# Patient Record
Sex: FEMALE | Race: WHITE | NOT HISPANIC OR LATINO | Employment: FULL TIME | URBAN - METROPOLITAN AREA
[De-identification: names, ages, dates, MRNs, and addresses within clinical notes are randomized per-mention and may not be internally consistent; named-entity substitution may affect disease eponyms.]

---

## 2023-03-27 ENCOUNTER — OFFICE VISIT (OUTPATIENT)
Dept: FAMILY MEDICINE | Facility: CLINIC | Age: 38
End: 2023-03-27
Payer: COMMERCIAL

## 2023-03-27 VITALS
BODY MASS INDEX: 46.1 KG/M2 | HEIGHT: 64 IN | DIASTOLIC BLOOD PRESSURE: 88 MMHG | SYSTOLIC BLOOD PRESSURE: 132 MMHG | HEART RATE: 90 BPM | OXYGEN SATURATION: 97 % | WEIGHT: 270 LBS

## 2023-03-27 DIAGNOSIS — E28.2 PCOS (POLYCYSTIC OVARIAN SYNDROME): ICD-10-CM

## 2023-03-27 DIAGNOSIS — G71.12 MYOTONIA CONGENITA: ICD-10-CM

## 2023-03-27 DIAGNOSIS — J45.20 MILD INTERMITTENT ASTHMA WITHOUT COMPLICATION: ICD-10-CM

## 2023-03-27 PROCEDURE — 1159F PR MEDICATION LIST DOCUMENTED IN MEDICAL RECORD: ICD-10-PCS | Mod: S$GLB,,, | Performed by: FAMILY MEDICINE

## 2023-03-27 PROCEDURE — 3075F SYST BP GE 130 - 139MM HG: CPT | Mod: S$GLB,,, | Performed by: FAMILY MEDICINE

## 2023-03-27 PROCEDURE — 3075F PR MOST RECENT SYSTOLIC BLOOD PRESS GE 130-139MM HG: ICD-10-PCS | Mod: S$GLB,,, | Performed by: FAMILY MEDICINE

## 2023-03-27 PROCEDURE — 99204 PR OFFICE/OUTPT VISIT, NEW, LEVL IV, 45-59 MIN: ICD-10-PCS | Mod: S$GLB,,, | Performed by: FAMILY MEDICINE

## 2023-03-27 PROCEDURE — 3079F PR MOST RECENT DIASTOLIC BLOOD PRESSURE 80-89 MM HG: ICD-10-PCS | Mod: S$GLB,,, | Performed by: FAMILY MEDICINE

## 2023-03-27 PROCEDURE — 3008F PR BODY MASS INDEX (BMI) DOCUMENTED: ICD-10-PCS | Mod: S$GLB,,, | Performed by: FAMILY MEDICINE

## 2023-03-27 PROCEDURE — 3008F BODY MASS INDEX DOCD: CPT | Mod: S$GLB,,, | Performed by: FAMILY MEDICINE

## 2023-03-27 PROCEDURE — 1159F MED LIST DOCD IN RCRD: CPT | Mod: S$GLB,,, | Performed by: FAMILY MEDICINE

## 2023-03-27 PROCEDURE — 3079F DIAST BP 80-89 MM HG: CPT | Mod: S$GLB,,, | Performed by: FAMILY MEDICINE

## 2023-03-27 PROCEDURE — 99204 OFFICE O/P NEW MOD 45 MIN: CPT | Mod: S$GLB,,, | Performed by: FAMILY MEDICINE

## 2023-03-27 RX ORDER — DROSPIRENONE AND ETHINYL ESTRADIOL 0.03MG-3MG
KIT ORAL
COMMUNITY

## 2023-03-27 RX ORDER — METFORMIN HYDROCHLORIDE 1000 MG/1
TABLET ORAL
COMMUNITY
Start: 2023-02-06

## 2023-03-27 RX ORDER — ALBUTEROL SULFATE 90 UG/1
AEROSOL, METERED RESPIRATORY (INHALATION)
COMMUNITY

## 2023-03-27 RX ORDER — BUDESONIDE AND FORMOTEROL FUMARATE DIHYDRATE 160; 4.5 UG/1; UG/1
2 AEROSOL RESPIRATORY (INHALATION) 2 TIMES DAILY
COMMUNITY
Start: 2022-12-29

## 2023-03-27 RX ORDER — OMEPRAZOLE 40 MG/1
40 CAPSULE, DELAYED RELEASE ORAL
COMMUNITY
Start: 2023-02-09

## 2023-03-27 RX ORDER — ALBUTEROL SULFATE 90 UG/1
2 AEROSOL, METERED RESPIRATORY (INHALATION)
COMMUNITY
Start: 2023-03-10

## 2023-03-27 RX ORDER — CYCLOBENZAPRINE HCL 5 MG
5 TABLET ORAL NIGHTLY PRN
Qty: 30 TABLET | Refills: 3 | Status: SHIPPED | OUTPATIENT
Start: 2023-03-27 | End: 2023-04-06

## 2023-03-27 NOTE — PATIENT INSTRUCTIONS
Will place referral for genetics at [facility]    Diet - focus on unprocessed whole foods diet - nuts, beans, eggs, whole grains, fruits, vegetables, leans proteins    Exercise - focus on swimming and weight lifting    Consider intermittent fasting - fast for 16 hours and eat for 8, while maintaining good eating habits.    Follow up 6 months    Use muscle relaxer as needed at bedtime

## 2023-03-27 NOTE — PROGRESS NOTES
"  [facility]  [facility] - [place], [place]    Family Medicine Office Visit    Chief Complaint   Patient presents with    Establish Care        HPI:  37 female in good health here to establish care.  Has some white coat HTN.     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      General:  AOx3, well nourished and developed in no acute distress  Eyes:  PERRLA, EOMI, vision intact grossly  ENT:  normal hearing, moist oral mucosa  Neck:  trachea midline with no masses or thyromegaly  Heart:  RRR, no murmurs.  No edema noted, extremities warm and well perfused  Lungs:  clear to auscultation bilaterally with symmetric chest movement  Abdomen:  Soft, nontender, nondistended.  Normal bowel sounds  Musculoskeletal:  Normal gait.  Normal posture.  Normal muscular development with no joint swelling.  Neurological:  CN II-XII grossly intact. Symmetric strength and sensation  Psych:  Normal mood and affect.  Able to demonstrate good judgement and personal insight.      Assessment/Plan:    Myotonia congenita  -     Ambulatory referral/consult to Genetics; Future; Expected date: 04/03/2023    PCOS (polycystic ovarian syndrome)    Mild intermittent asthma without complication     1.  Place referralt to genetics - use muscle relaxer at bedtime prn  2.  Stable on metformin, will pursue dietary therapy and fasting to iimprove insulin " homeostasis  3.  stable

## 2023-04-03 ENCOUNTER — PATIENT MESSAGE (OUTPATIENT)
Dept: OBSTETRICS AND GYNECOLOGY | Facility: CLINIC | Age: 38
End: 2023-04-03
Payer: COMMERCIAL

## 2023-04-11 ENCOUNTER — PATIENT MESSAGE (OUTPATIENT)
Dept: ADMINISTRATIVE | Facility: HOSPITAL | Age: 38
End: 2023-04-11
Payer: COMMERCIAL

## 2024-07-09 ENCOUNTER — PATIENT MESSAGE (OUTPATIENT)
Dept: ADMINISTRATIVE | Facility: HOSPITAL | Age: 39
End: 2024-07-09
Payer: COMMERCIAL

## 2024-12-02 ENCOUNTER — OFFICE VISIT (OUTPATIENT)
Dept: FAMILY MEDICINE | Facility: CLINIC | Age: 39
End: 2024-12-02
Payer: COMMERCIAL

## 2024-12-02 VITALS
OXYGEN SATURATION: 98 % | WEIGHT: 262.81 LBS | DIASTOLIC BLOOD PRESSURE: 86 MMHG | HEART RATE: 86 BPM | HEIGHT: 64 IN | SYSTOLIC BLOOD PRESSURE: 138 MMHG | BODY MASS INDEX: 44.87 KG/M2

## 2024-12-02 DIAGNOSIS — E28.2 PCOS (POLYCYSTIC OVARIAN SYNDROME): Primary | ICD-10-CM

## 2024-12-02 DIAGNOSIS — J45.20 MILD INTERMITTENT ASTHMA WITHOUT COMPLICATION: ICD-10-CM

## 2024-12-02 PROCEDURE — 3008F BODY MASS INDEX DOCD: CPT | Mod: CPTII,S$GLB,, | Performed by: FAMILY MEDICINE

## 2024-12-02 PROCEDURE — G2211 COMPLEX E/M VISIT ADD ON: HCPCS | Mod: S$GLB,,, | Performed by: FAMILY MEDICINE

## 2024-12-02 PROCEDURE — 3079F DIAST BP 80-89 MM HG: CPT | Mod: CPTII,S$GLB,, | Performed by: FAMILY MEDICINE

## 2024-12-02 PROCEDURE — 99214 OFFICE O/P EST MOD 30 MIN: CPT | Mod: S$GLB,,, | Performed by: FAMILY MEDICINE

## 2024-12-02 PROCEDURE — 3044F HG A1C LEVEL LT 7.0%: CPT | Mod: CPTII,S$GLB,, | Performed by: FAMILY MEDICINE

## 2024-12-02 PROCEDURE — 1159F MED LIST DOCD IN RCRD: CPT | Mod: CPTII,S$GLB,, | Performed by: FAMILY MEDICINE

## 2024-12-02 PROCEDURE — 3075F SYST BP GE 130 - 139MM HG: CPT | Mod: CPTII,S$GLB,, | Performed by: FAMILY MEDICINE

## 2024-12-02 RX ORDER — BUPROPION HYDROCHLORIDE 150 MG/1
150 TABLET ORAL DAILY
Qty: 30 TABLET | Refills: 11 | Status: SHIPPED | OUTPATIENT
Start: 2024-12-02 | End: 2025-12-02

## 2024-12-02 RX ORDER — OMEPRAZOLE 40 MG/1
40 CAPSULE, DELAYED RELEASE ORAL EVERY MORNING
Qty: 90 CAPSULE | Refills: 3 | Status: SHIPPED | OUTPATIENT
Start: 2024-12-02

## 2024-12-02 RX ORDER — METFORMIN HYDROCHLORIDE 1000 MG/1
1000 TABLET ORAL 2 TIMES DAILY WITH MEALS
Qty: 60 TABLET | Refills: 11 | Status: SHIPPED | OUTPATIENT
Start: 2024-12-02

## 2024-12-02 RX ORDER — BUDESONIDE AND FORMOTEROL FUMARATE DIHYDRATE 160; 4.5 UG/1; UG/1
2 AEROSOL RESPIRATORY (INHALATION) 2 TIMES DAILY
Qty: 10.2 G | Refills: 6 | Status: SHIPPED | OUTPATIENT
Start: 2024-12-02

## 2024-12-02 RX ORDER — ALBUTEROL SULFATE 90 UG/1
2 INHALANT RESPIRATORY (INHALATION)
Qty: 18 G | Refills: 6 | Status: SHIPPED | OUTPATIENT
Start: 2024-12-02

## 2024-12-02 RX ORDER — ALBUTEROL SULFATE 90 UG/1
INHALANT RESPIRATORY (INHALATION)
Qty: 18 G | Status: CANCELLED | OUTPATIENT
Start: 2024-12-02

## 2024-12-02 RX ORDER — DROSPIRENONE AND ETHINYL ESTRADIOL 0.03MG-3MG
KIT ORAL
Status: CANCELLED | OUTPATIENT
Start: 2024-12-02

## 2024-12-02 NOTE — PATIENT INSTRUCTIONS
Resume asthma medications    Resume metformin - will help regulate sugar  Start wellbutrin - will stimulate mood and help with weight reduction    Start semaglutide - hormonal shot to help with weight loss

## 2024-12-02 NOTE — PROGRESS NOTES
"    [facility]  [facility] - [place], [place]    Family Medicine Office Visit    Chief Complaint   Patient presents with    Follow-up        HPI:  39 female with notable history of both PCOS and asthma    Asthma stable, but needs refill of inhaler and controller medication    Weight gain has become huge issues causing ovulatory disregulation    ROS: as above    Vitals:    12/02/24 1531   BP: 138/86   BP Location: Right forearm   Patient Position: Sitting   Pulse: 86   SpO2: 98%   Weight: 119.2 kg (262 lb 12.8 oz)   Height: 5' 4" (1.626 m)      Body mass index is 45.11 kg/m².      General:  AOx3, well nourished and developed in no acute distress  Eyes:  PERRLA, EOMI, vision intact grossly  ENT:  normal hearing, moist oral mucosa  Neck:  trachea midline with no masses or thyromegaly  Heart:  RRR, no murmurs.  No edema noted, extremities warm and well perfused  Lungs:  clear to auscultation bilaterally with symmetric chest movement  Abdomen:  Soft, nontender, nondistended.  Normal bowel sounds  Musculoskeletal:  Normal gait.  Normal posture.  Normal muscular development with no joint swelling.  Neurological:  CN II-XII grossly intact. Symmetric strength and sensation  Psych:  Normal mood and affect.  Able to demonstrate good judgement and personal insight.      Assessment/Plan:    1. PCOS (polycystic ovarian syndrome)    2. Mild intermittent asthma without complication       Start wellbutrin and semaglutide to improve sugar metabolism and cause weight loss.  Will help mental health as well  Filled prn and controller medication\        Visit today included increased complexity associated with the care of the episodic problem pcos, asthma addressed and managing the longitudinal care of the patient due to the serious and/or complex managed problem(s) pcos, asthma.        "

## 2024-12-06 ENCOUNTER — TELEPHONE (OUTPATIENT)
Dept: FAMILY MEDICINE | Facility: CLINIC | Age: 39
End: 2024-12-06
Payer: COMMERCIAL

## 2024-12-06 NOTE — TELEPHONE ENCOUNTER
----- Message from Delmi sent at 12/6/2024 12:09 PM CST -----  Contact: PT  Type: Needs Medical Advice    Who Called: PT  Best Call Back Number: [phone]  Additional  Information: PT requesting to get a PA submitted for RX semaglutide, weight loss, 0.25 mg/0.5 mL PnIj. Can not  prescription un til pharmacy receive back  Please Advise- Thank you

## 2024-12-09 ENCOUNTER — TELEPHONE (OUTPATIENT)
Dept: FAMILY MEDICINE | Facility: CLINIC | Age: 39
End: 2024-12-09
Payer: COMMERCIAL

## 2024-12-09 NOTE — TELEPHONE ENCOUNTER
----- Message from Lise sent at 12/9/2024 11:42 AM CST -----  Contact: Patient  Type:  Needs Medical Advice    Who Called: Patient    Pharmacy name and phone #:    [id number] - [place], [place] - [address] [address]  [address] [address] [address]  Phone: [phone] Fax: [phone]      Would the patient rather a call back or a response via [facility]? Call    Best Call Back Number: [phone]    Additional Information: Patient medication is in need of PA    semaglutide, weight loss, 0.25 mg/0.5 mL PnIj    SYMBICORT 160-4.5 mcg/actuation HFAA

## 2024-12-11 ENCOUNTER — TELEPHONE (OUTPATIENT)
Dept: FAMILY MEDICINE | Facility: CLINIC | Age: 39
End: 2024-12-11
Payer: COMMERCIAL

## 2024-12-11 NOTE — TELEPHONE ENCOUNTER
----- Message from Charity sent at 12/10/2024 11:37 AM CST -----  Contact: pt [phone]  Type: Needs Medical Advice  Who Called:  Pt     Best Call Back Number: [phone]    Additional Information: Pt calling to f/u on pa for wegovy and symbicort . Pls call back and advise

## 2024-12-17 ENCOUNTER — TELEPHONE (OUTPATIENT)
Dept: FAMILY MEDICINE | Facility: CLINIC | Age: 39
End: 2024-12-17
Payer: COMMERCIAL

## 2025-01-09 ENCOUNTER — PATIENT MESSAGE (OUTPATIENT)
Dept: ADMINISTRATIVE | Facility: HOSPITAL | Age: 40
End: 2025-01-09
Payer: COMMERCIAL

## 2025-01-10 ENCOUNTER — PATIENT OUTREACH (OUTPATIENT)
Dept: ADMINISTRATIVE | Facility: HOSPITAL | Age: 40
End: 2025-01-10
Payer: COMMERCIAL

## 2025-01-27 ENCOUNTER — TELEPHONE (OUTPATIENT)
Dept: FAMILY MEDICINE | Facility: CLINIC | Age: 40
End: 2025-01-27
Payer: COMMERCIAL

## 2025-01-27 NOTE — TELEPHONE ENCOUNTER
----- Message from Mony sent at 1/27/2025  4:13 PM CST -----   Type:  Needs Medical Advice    Who Called:  PT  Would the patient rather a call back or a response via [facility]? Call  Best Call Back Number: [phone]        Additional Information: pt states he breathing rx (Ipratropium/AlbiterSol)  Please call back to advise. Thank you!

## 2025-01-28 RX ORDER — IPRATROPIUM BROMIDE AND ALBUTEROL SULFATE 2.5; .5 MG/3ML; MG/3ML
3 SOLUTION RESPIRATORY (INHALATION) EVERY 6 HOURS PRN
Qty: 75 ML | Refills: 0 | Status: SHIPPED | OUTPATIENT
Start: 2025-01-28 | End: 2026-01-28

## 2025-03-20 ENCOUNTER — OFFICE VISIT (OUTPATIENT)
Dept: FAMILY MEDICINE | Facility: CLINIC | Age: 40
End: 2025-03-20
Payer: COMMERCIAL

## 2025-03-20 VITALS
DIASTOLIC BLOOD PRESSURE: 82 MMHG | HEART RATE: 92 BPM | HEIGHT: 64 IN | SYSTOLIC BLOOD PRESSURE: 126 MMHG | OXYGEN SATURATION: 96 % | WEIGHT: 273.5 LBS | BODY MASS INDEX: 46.69 KG/M2

## 2025-03-20 DIAGNOSIS — J45.20 MILD INTERMITTENT ASTHMA WITHOUT COMPLICATION: ICD-10-CM

## 2025-03-20 DIAGNOSIS — E66.01 MORBID OBESITY: ICD-10-CM

## 2025-03-20 DIAGNOSIS — E28.2 PCOS (POLYCYSTIC OVARIAN SYNDROME): Primary | ICD-10-CM

## 2025-03-20 PROCEDURE — 99214 OFFICE O/P EST MOD 30 MIN: CPT | Mod: S$GLB,,, | Performed by: FAMILY MEDICINE

## 2025-03-20 PROCEDURE — 3008F BODY MASS INDEX DOCD: CPT | Mod: CPTII,S$GLB,, | Performed by: FAMILY MEDICINE

## 2025-03-20 PROCEDURE — 1159F MED LIST DOCD IN RCRD: CPT | Mod: CPTII,S$GLB,, | Performed by: FAMILY MEDICINE

## 2025-03-20 PROCEDURE — 3074F SYST BP LT 130 MM HG: CPT | Mod: CPTII,S$GLB,, | Performed by: FAMILY MEDICINE

## 2025-03-20 PROCEDURE — G2211 COMPLEX E/M VISIT ADD ON: HCPCS | Mod: S$GLB,,, | Performed by: FAMILY MEDICINE

## 2025-03-20 PROCEDURE — 3079F DIAST BP 80-89 MM HG: CPT | Mod: CPTII,S$GLB,, | Performed by: FAMILY MEDICINE

## 2025-03-20 RX ORDER — BUPROPION HYDROCHLORIDE 300 MG/1
300 TABLET ORAL DAILY
Qty: 30 TABLET | Refills: 11 | Status: SHIPPED | OUTPATIENT
Start: 2025-03-20 | End: 2026-03-20

## 2025-03-20 NOTE — PROGRESS NOTES
"    [facility]  [facility] - [place], [place]    Family Medicine Office Visit    Chief Complaint   Patient presents with    Follow-up     3 month follow up         HPI:  followup chronic conditions:    Asthma stable  Weight has increased despite prescription of metformin 3 months ago.  In setting of PCOS and notable hormone dysregulation.  Also started wellbutrin and semaglutide last visit as well.    Up 11 pounds    ROS: as above    Vitals:    03/20/25 0703   BP: 126/82   BP Location: Left arm   Patient Position: Sitting   Pulse: 92   SpO2: 96%   Weight: 124.1 kg (273 lb 8 oz)   Height: 5' 4" (1.626 m)      Body mass index is 46.95 kg/m².      General:  AOx3, well nourished and developed in no acute distress  Eyes:  PERRLA, EOMI, vision intact grossly  ENT:  normal hearing, moist oral mucosa  Neck:  trachea midline with no masses or thyromegaly  Heart:  RRR, no murmurs.  No edema noted, extremities warm and well perfused  Lungs:  clear to auscultation bilaterally with symmetric chest movement  Abdomen:  Soft, nontender, nondistended.  Normal bowel sounds  Musculoskeletal:  Normal gait.  Normal posture.  Normal muscular development with no joint swelling.  Neurological:  CN II-XII grossly intact. Symmetric strength and sensation  Psych:  Normal mood and affect.  Able to demonstrate good judgement and personal insight.      Assessment/Plan:    1. PCOS (polycystic ovarian syndrome)    2. Mild intermittent asthma without complication    3. Morbid obesity    Other orders  -     semaglutide, weight loss, 0.5 mg/0.5 mL PnIj; Inject 0.5 mg into the skin every 7 days.  Dispense: 2 mL; Refill: 11  -     buPROPion (WELLBUTRIN XL) 300 MG 24 hr tablet; Take 1 tablet (300 mg total) by mouth once daily.  Dispense: 30 tablet; Refill: 11       Continue to focus on body composition and weight loss.  Increase semaglutide and wellbutrin.  Advised on goal exercise, goal protein intake, and caloric " restriction  Stable  As above    Visit today included increased complexity associated with the care of the episodic problem pcos, asthma addressed and managing the longitudinal care of the patient due to the serious and/or complex managed problem(s) pcos, asthma.

## 2025-03-20 NOTE — PATIENT INSTRUCTIONS
Wellbutrin increase to 300mg (double current dose)  Will increase dose of semaglutide to 0.5mg.  I want you message me every month to try and increase dose of medication    Goal calories per day is 1500 or less  Protein needs:  at least 100 grams daily    Most of food should be plants and protein only    Exercise:  low impact cardio.  Only intense enough for a moderate shortness of breath, or limit to conversation    Follow up 3 months

## 2025-03-31 ENCOUNTER — TELEPHONE (OUTPATIENT)
Dept: FAMILY MEDICINE | Facility: CLINIC | Age: 40
End: 2025-03-31
Payer: COMMERCIAL

## 2025-03-31 NOTE — TELEPHONE ENCOUNTER
----- Message from Dena sent at 3/28/2025 12:40 PM CDT -----  Contact: pt [phone]  Type:  RX Refill RequestWho Called: pt Refill or New Rx:refill RX Name and Strength:semaglutide, weight loss, 0.5 mg/0.5 mL PnIjHow is the patient currently taking it? (ex. 1XDay):1xWeekIs this a 30 day or 90 day RX:30Preferred Pharmacy with phone number:[facility] [phone], [place] [address]Wfzvq or Mail Order:localOrdering Provider: Douglasould the patient rather a call back or a response via [facility]? Call back Best Call Back Number:[phone] Information: pt would like to if the prescription above could be sent to a different pharmacy one listed above, [facility] so she would like it sent somewhere else if possible , Thank you so much I hope you have a great day ! She is completely out and is supposed to take it tonight. Please call when you can Thank you so much ! Pharmacy closes at 6 if possible can it be sent before then?  ----- Message -----  From: Dena Farr  Sent: 3/28/2025  12:46 PM CDT  To: Arlene AMADOR Staff    Type:  RX Refill RequestWho Called: pt Refill or New Rx:refill RX Name and Strength:semaglutide, weight loss, 0.5 mg/0.5 mL PnIjHow is the patient currently taking it? (ex. 1XDay):1xWeekIs this a 30 day or 90 day RX:30Preferred Pharmacy with phone number:[facility] [phone] [address], [place] [address] or Mail Order:localOrdering Provider: ArleneWould the patient rather a call back or a response via [facility]? Call back Best Call Back Number:[phone] Information: pt would like to if the prescription above could be sent to a different pharmacy one listed above, [facility] so she would like it sent somewhere else if possible , Thank you so much I hope you have a great day ! She is completely out and is supposed to take it tonight. Please call  when you can Thank you so much ! Pharmacy closes at 6 if possible can it be sent before then?

## 2025-03-31 NOTE — TELEPHONE ENCOUNTER
----- Message from Med Assistant Sung sent at 3/28/2025  4:36 PM CDT -----  Contact: Self    ----- Message -----  From: Sasha Majano  Sent: 3/28/2025   4:27 PM CDT  To: Arlene AMADOR Staff    Type: Needs Medical AdviceWho Called:  Patient Pharmacy name and phone #:  [facility] [address] [address] [id number] [place] [address]: [phone] Fax: [phone] Call Back Number: [phone] Information: Pt is calling back in regards to her semaglutide, weight loss, 0.5 mg/0.5 mL Tete, stated [facility] is telling her they did not receive this rx for the 0.5 and she is due to take this tonight, Can we please have this resent since it shows it went through on 03/20 or call the pharmacy to see what's wrong. Can we please check on this and call pt back to advise. Thank You

## 2025-04-28 ENCOUNTER — TELEPHONE (OUTPATIENT)
Dept: FAMILY MEDICINE | Facility: CLINIC | Age: 40
End: 2025-04-28
Payer: COMMERCIAL

## 2025-04-28 NOTE — TELEPHONE ENCOUNTER
Jnoh Jacobs,  Please review this message below from this patient concerning her Wegovy Rx.   Spoke w/pt states calling to left Dr. Jacobs know is doing well on the Wegovy 0.5 mg inj. Is requesting an increase in the dosage.   Last office visit: 3/20/2025  Upcoming Appointment: 6/20/2025  Please review.  Thank you.  Signed:  Marcio Murphy LPN    ----- Message from Charity sent at 4/25/2025  4:33 PM CDT -----  Contact: pt [phone]  Type: Needs Medical AdviceWho Called:  Pt Best Call Back Number: [phone] Information: Pt calling to let Dr Jacobs know she is doing well with her semaglutide, weight loss, 0.5 mg/0.5 mL PnIj. Pt will be taking the last inj tonight. Pls send message back on [facility]

## 2025-05-01 ENCOUNTER — OFFICE VISIT (OUTPATIENT)
Dept: FAMILY MEDICINE | Facility: CLINIC | Age: 40
End: 2025-05-01
Payer: COMMERCIAL

## 2025-05-01 VITALS
HEIGHT: 64 IN | BODY MASS INDEX: 46.92 KG/M2 | SYSTOLIC BLOOD PRESSURE: 160 MMHG | DIASTOLIC BLOOD PRESSURE: 92 MMHG | HEART RATE: 86 BPM | WEIGHT: 274.81 LBS | OXYGEN SATURATION: 98 %

## 2025-05-01 DIAGNOSIS — E28.2 PCOS (POLYCYSTIC OVARIAN SYNDROME): ICD-10-CM

## 2025-05-01 DIAGNOSIS — J45.20 MILD INTERMITTENT ASTHMA WITHOUT COMPLICATION: ICD-10-CM

## 2025-05-01 DIAGNOSIS — F41.9 ANXIETY: ICD-10-CM

## 2025-05-01 DIAGNOSIS — E66.01 MORBID OBESITY: Primary | ICD-10-CM

## 2025-05-01 RX ORDER — FLUOXETINE HYDROCHLORIDE 20 MG/1
20 CAPSULE ORAL DAILY
Qty: 30 CAPSULE | Refills: 11 | Status: SHIPPED | OUTPATIENT
Start: 2025-05-01 | End: 2026-05-01

## 2025-05-01 NOTE — PATIENT INSTRUCTIONS
Start prozac today to reduce anxiety symptoms  Therapy - Patrizia Thorpe is therapist in [facility], can offer help  Continue routine exercise    Follow up 3 months

## 2025-05-01 NOTE — PROGRESS NOTES
"    [facility]  [facility] - [place], [place]    Family Medicine Office Visit    Chief Complaint   Patient presents with    Anxiety        HPI:  followup anxiety management - reporting notable worsening of anxiety, more sensation of doom and panic attack.  Over past year has lost student, close friend    Have been working diligently to lose weight, in setting of PCOS/metabolic syndrome    Medications attempted:    Wellbutrin, semaglutide, metformin    Asthma stable    ROS: as above    Vitals:    05/01/25 1121   BP: (!) 160/92   BP Location: Left arm   Patient Position: Sitting   Pulse: 86   SpO2: 98%   Weight: 124.6 kg (274 lb 12.8 oz)   Height: 5' 4" (1.626 m)      Body mass index is 47.17 kg/m².      General:  AOx3, well nourished and developed in no acute distress  Eyes:  PERRLA, EOMI, vision intact grossly  ENT:  normal hearing, moist oral mucosa  Neck:  trachea midline with no masses or thyromegaly  Heart:  RRR, no murmurs.  No edema noted, extremities warm and well perfused  Lungs:  clear to auscultation bilaterally with symmetric chest movement  Abdomen:  Soft, nontender, nondistended.  Normal bowel sounds  Musculoskeletal:  Normal gait.  Normal posture.  Normal muscular development with no joint swelling.  Neurological:  CN II-XII grossly intact. Symmetric strength and sensation  Psych:  Normal mood and affect.  Able to demonstrate good judgement and personal insight.      Assessment/Plan:    1. Morbid obesity    2. PCOS (polycystic ovarian syndrome)    3. Anxiety    4. Mild intermittent asthma without complication    Other orders  -     semaglutide, weight loss, 1 mg/0.5 mL PnIj; Inject 1 mg into the skin every 7 days.  Dispense: 2 mL; Refill: 6  -     FLUoxetine 20 MG capsule; Take 1 capsule (20 mg total) by mouth once daily.  Dispense: 30 capsule; Refill: 11       Increase ozempic dose  Stable  Start fluoxetine, exercise,and therapy  stable    Visit today included increased complexity " associated with the care of the episodic problem anxiety, pcos addressed and managing the longitudinal care of the patient due to the serious and/or complex managed problem(s) anxiety, pcos.

## 2025-05-29 ENCOUNTER — PATIENT MESSAGE (OUTPATIENT)
Dept: FAMILY MEDICINE | Facility: CLINIC | Age: 40
End: 2025-05-29
Payer: COMMERCIAL